# Patient Record
Sex: MALE | Race: WHITE | NOT HISPANIC OR LATINO | Employment: OTHER | URBAN - METROPOLITAN AREA
[De-identification: names, ages, dates, MRNs, and addresses within clinical notes are randomized per-mention and may not be internally consistent; named-entity substitution may affect disease eponyms.]

---

## 2017-04-14 ENCOUNTER — HOSPITAL ENCOUNTER (OUTPATIENT)
Dept: GENERAL RADIOLOGY | Facility: HOSPITAL | Age: 62
Discharge: HOME | End: 2017-04-14
Attending: INTERNAL MEDICINE | Admitting: INTERNAL MEDICINE

## 2017-04-14 DIAGNOSIS — R10.2 PELVIC PAIN: ICD-10-CM

## 2017-04-14 DIAGNOSIS — M25.551 RIGHT HIP PAIN: ICD-10-CM

## 2017-04-14 PROCEDURE — 73502 X-RAY EXAM HIP UNI 2-3 VIEWS: CPT

## 2017-12-20 ENCOUNTER — TRANSCRIBE ORDERS (OUTPATIENT)
Dept: ADMINISTRATIVE | Facility: HOSPITAL | Age: 62
End: 2017-12-20

## 2017-12-20 DIAGNOSIS — M54.10 RADICULOPATHY AFFECTING UPPER EXTREMITY: ICD-10-CM

## 2017-12-20 DIAGNOSIS — M54.2 NECK PAIN: Primary | ICD-10-CM

## 2018-01-03 ENCOUNTER — HOSPITAL ENCOUNTER (OUTPATIENT)
Dept: GENERAL RADIOLOGY | Facility: HOSPITAL | Age: 63
Discharge: HOME | End: 2018-01-03
Attending: INTERNAL MEDICINE

## 2018-01-03 ENCOUNTER — HOSPITAL ENCOUNTER (OUTPATIENT)
Dept: INFUSION THERAPY | Facility: HOSPITAL | Age: 63
Discharge: HOME | End: 2018-01-03
Attending: INTERNAL MEDICINE | Admitting: PSYCHIATRY & NEUROLOGY

## 2018-01-03 DIAGNOSIS — M54.10 RADICULOPATHY OF ARM: ICD-10-CM

## 2018-01-03 DIAGNOSIS — M54.2 NECK PAIN: ICD-10-CM

## 2018-01-03 DIAGNOSIS — M79.601 RIGHT ARM PAIN: ICD-10-CM

## 2018-01-03 DIAGNOSIS — M54.10 RADICULOPATHY AFFECTING UPPER EXTREMITY: ICD-10-CM

## 2018-01-03 PROCEDURE — 72050 X-RAY EXAM NECK SPINE 4/5VWS: CPT

## 2018-01-03 PROCEDURE — 95886 MUSC TEST DONE W/N TEST COMP: CPT | Performed by: PSYCHIATRY & NEUROLOGY

## 2018-01-03 PROCEDURE — 95910 NRV CNDJ TEST 7-8 STUDIES: CPT | Performed by: PSYCHIATRY & NEUROLOGY

## 2018-01-03 PROCEDURE — 95886 MUSC TEST DONE W/N TEST COMP: CPT

## 2018-01-03 PROCEDURE — 95910 NRV CNDJ TEST 7-8 STUDIES: CPT

## 2018-01-03 NOTE — PROCEDURES
EMG and Nerve Conduction Studies    Please see data sheets for details on methods, temperatures and lab standards. EMG muscles tested for upper extremity studies include the deltoid, biceps, triceps, pronator teres, extensor digitorum communis, first dorsal interosseous and abductor pollicis brevis.  EMG muscles tested for lower extremity studies include the vastus lateralis, tibialis anterior, peroneus longus, medial gastrocnemius and extensor digitorum brevis.  Additional muscles tested as needed.  Paraspinal muscles tested as needed.  The complete report includes the data sheets.    Indication: Numbness in the right hand greater than left with some aching in the right radial forearm.  No substantial neck shoulder or elbow pain  History: As above      Ht: Not reported  Wt: Not reported  HbA1C:   Lab Results   Component Value Date    HGBA1C 5.4 03/07/2015     TSH: No results found for: TSH    Technical summary:  Nerve conduction studies were obtained in the right arm with some comparisons on the left where indicated.  The hands were cold prior to study and they were warmed in the sink.  Skin temperatures were 32°C measured at the palms.  Needle examination was obtained on selected muscles of the right arm    Results:  1.  Mildly prolonged median sensory latencies bilaterally at 3.7 ms each with normal amplitudes.  2.  Prolonged ulnar sensory latencies bilaterally at 4.4 ms on the left and 4.5 ms on the right with normal amplitudes.  3.  Normal right radial sensory study.  4.  Normal right median motor study.  5.  Normal right ulnar motor study.  6.  Normal needle examination of selected muscles of the right arm.    Impression:  Abnormal study showing mild sensory peripheral neuropathy.  No evidence of a right cervical radiculopathy by this study.  Study results were discussed with the patient.    Casey Oliveira M.D.              Dictated utilizing Dragon dictation.

## 2018-04-20 ENCOUNTER — HOSPITAL ENCOUNTER (OUTPATIENT)
Dept: GENERAL RADIOLOGY | Facility: HOSPITAL | Age: 63
Discharge: HOME | End: 2018-04-20
Attending: INTERNAL MEDICINE | Admitting: INTERNAL MEDICINE

## 2018-04-20 DIAGNOSIS — R94.2 ABNORMAL PULMONARY FUNCTION TEST: ICD-10-CM

## 2018-04-20 PROCEDURE — 71046 X-RAY EXAM CHEST 2 VIEWS: CPT

## 2019-12-01 ENCOUNTER — HOSPITAL ENCOUNTER (EMERGENCY)
Facility: HOSPITAL | Age: 64
Discharge: HOME | End: 2019-12-01
Attending: EMERGENCY MEDICINE | Admitting: EMERGENCY MEDICINE

## 2019-12-01 VITALS
HEIGHT: 73 IN | TEMPERATURE: 97.8 F | SYSTOLIC BLOOD PRESSURE: 170 MMHG | DIASTOLIC BLOOD PRESSURE: 92 MMHG | HEART RATE: 75 BPM | OXYGEN SATURATION: 97 % | RESPIRATION RATE: 22 BRPM

## 2019-12-01 DIAGNOSIS — K08.89 PAIN, DENTAL: Primary | ICD-10-CM

## 2019-12-01 PROCEDURE — 99283 EMERGENCY DEPT VISIT LOW MDM: CPT

## 2019-12-01 RX ORDER — SIMVASTATIN 20 MG
TABLET ORAL
COMMUNITY
Start: 2019-08-16

## 2019-12-01 RX ORDER — WARFARIN SODIUM 4 MG/1
TABLET ORAL
COMMUNITY
Start: 2012-07-31

## 2019-12-01 RX ORDER — WARFARIN SODIUM 4 MG/1
TABLET ORAL
COMMUNITY
Start: 2013-07-03

## 2019-12-01 RX ORDER — NITROGLYCERIN 0.4 MG/1
0.4 TABLET SUBLINGUAL
COMMUNITY
Start: 2012-11-11

## 2019-12-01 RX ORDER — PYRAZINAMIDE 500 MG/1
300 TABLET ORAL 3 TIMES DAILY PRN
COMMUNITY
End: 2020-04-15

## 2019-12-01 RX ORDER — FLUOCINONIDE TOPICAL SOLUTION USP, 0.05% 0.5 MG/ML
SOLUTION TOPICAL
COMMUNITY

## 2019-12-01 RX ORDER — PRIMIDONE 50 MG/1
TABLET ORAL
COMMUNITY
Start: 2019-02-21

## 2019-12-01 RX ORDER — SIMVASTATIN 40 MG
TABLET ORAL
COMMUNITY

## 2019-12-01 RX ORDER — CHLORHEXIDINE GLUCONATE 0.12 MG/ML
RINSE ORAL
COMMUNITY
Start: 2019-07-01

## 2019-12-01 RX ORDER — HYDROCODONE BITARTRATE AND ACETAMINOPHEN 5; 325 MG/1; MG/1
1 TABLET ORAL EVERY 6 HOURS PRN
Qty: 12 TABLET | Refills: 0 | OUTPATIENT
Start: 2019-12-01 | End: 2020-04-15

## 2019-12-01 RX ORDER — HYDROCODONE BITARTRATE AND ACETAMINOPHEN 5; 325 MG/1; MG/1
1 TABLET ORAL ONCE
Status: COMPLETED | OUTPATIENT
Start: 2019-12-01 | End: 2019-12-01

## 2019-12-01 RX ORDER — CHOLECALCIFEROL (VITAMIN D3) 1250 MCG
1 CAPSULE ORAL
COMMUNITY

## 2019-12-01 RX ORDER — CLONAZEPAM 0.5 MG/1
TABLET ORAL
COMMUNITY
Start: 2019-09-20

## 2019-12-01 RX ADMIN — HYDROCODONE BITARTRATE AND ACETAMINOPHEN 1 TABLET: 5; 325 TABLET ORAL at 18:22

## 2019-12-01 NOTE — DISCHARGE INSTRUCTIONS
Continue current medications, follow-up with cardiology and your dentist.  Return to the emergency department for worsening symptoms as needed.

## 2019-12-01 NOTE — ED NOTES
Assisted patient to the restroom with Myesha AMADOR. Patient tolerated well     Sarita Connors RN  12/01/19 8054

## 2019-12-01 NOTE — ED PROVIDER NOTES
EMERGENCY DEPARTMENT ENCOUNTER    Room Number:  08/08  Date of encounter:  12/1/2019  PCP: Jacob Mccartney MD  Historian: Patient      HPI:  Chief Complaint: Dental pain  A complete HPI/ROS/PMH/PSH/SH/FH are unobtainable due to: None    Context: Hans Carter is a 64 y.o. male who presents to the ED c/o dental pain in the right upper and lower jaw, reports plan for dental procedures in the next week or 2 pending appropriate transition off of Coumadin.  Patient is already on antibiotics, but had a severe pain spell today at dinner.  Denies fevers, difficulty swallowing, neck pain.      PAST MEDICAL HISTORY  Active Ambulatory Problems     Diagnosis Date Noted   • No Active Ambulatory Problems     Resolved Ambulatory Problems     Diagnosis Date Noted   • No Resolved Ambulatory Problems     Past Medical History:   Diagnosis Date   • Blind    • Coronary artery disease    • Hyperlipidemia    • Hypertension    • Marfan syndrome          PAST SURGICAL HISTORY  Past Surgical History:   Procedure Laterality Date   • CARDIAC VALVE SURGERY           FAMILY HISTORY  History reviewed. No pertinent family history.      SOCIAL HISTORY  Social History     Socioeconomic History   • Marital status:      Spouse name: Not on file   • Number of children: Not on file   • Years of education: Not on file   • Highest education level: Not on file   Tobacco Use   • Smoking status: Never Smoker   Substance and Sexual Activity   • Alcohol use: No     Frequency: Never   • Drug use: No   • Sexual activity: Defer         ALLERGIES  Erythromycin        REVIEW OF SYSTEMS  Review of Systems     All systems reviewed and negative except for those discussed in HPI.       PHYSICAL EXAM    I have reviewed the triage vital signs and nursing notes.    ED Triage Vitals [12/01/19 1734]   Temp Heart Rate Resp BP SpO2   97.8 °F (36.6 °C) 75 22 (!) 180/108 97 %      Temp src Heart Rate Source Patient Position BP Location FiO2 (%)   Oral Monitor  Lying -- --       Physical Exam  General: Awake, alert, no acute distress  HEENT: Poor dentition diffusely with multiple caries noted in the upper and lower jaw.  No evidence of obvious periodontal abscesses or severe gingival erythema or drainage.  Posterior oropharynx is clear  Pulm: Symmetric chest rise, nonlabored breathing, no stridor  CV: Regular rate and rhythm  GI: Non-distended  MSK: No deformity  Skin: Warm, dry  Neuro: Alert and oriented x 3, moving all extremities, no focal deficits  Psych: Calm, cooperative    Vital signs and nursing notes reviewed.           LAB RESULTS  No results found for this or any previous visit (from the past 24 hour(s)).        RADIOLOGY  No Radiology Exams Resulted Within Past 24 Hours        PROCEDURES    Procedures      MEDICATIONS GIVEN IN ER    Medications   HYDROcodone-acetaminophen (NORCO) 5-325 MG per tablet 1 tablet (1 tablet Oral Given 12/1/19 1822)         PROGRESS, DATA ANALYSIS, CONSULTS, AND MEDICAL DECISION MAKING    All labs have been independently reviewed by me.  All radiology studies have been reviewed by me and discussed with radiologist dictating the report.   EKG's independently viewed and interpreted by me.  Discussion below represents my analysis of pertinent findings related to patient's condition, differential diagnosis, treatment plan and final disposition.    Patient comes in today for violation for dental pain.  Patient has Milo seen a dentist and there is plans for dental extraction, but he is on Coumadin and is working with cardiology to appropriately come off his Coumadin.  Has appointment with him this week.  Patient already on antibiotics.  At this point there are no drainable abscesses noted, no need for additional antibiotics.  Due to being on Coumadin currently, will give short course of Norco for pain control as anti-inflammatories would increase risk of bleeding at this point.  Patient given a [place] in the emergency department, plan for  discharge home with close dental and cardiology follow-up as discussed.  Advised return to the emergency department for worsening symptoms as needed.    ED Course as of Dec 01 1923   Sun Dec 01, 2019   1747 SACHI reviewed, 16 [place] 5 prescribed 11/11/19  [DC]      ED Course User Index  [DC] Hans Alamo MD       AS OF 7:23 PM VITALS:    BP - 170/92  HR - 75  TEMP - 97.8 °F (36.6 °C) (Oral)  02 SATS - 97%        DIAGNOSIS  Final diagnoses:   Pain, dental         DISPOSITION  DISCHARGE    Patient discharged in stable condition.    Reviewed implications of results, diagnosis, meds, responsibility to follow up, warning signs and symptoms of possible worsening, potential complications and reasons to return to ER.    Patient/Family voiced understanding of above instructions.    Discussed plan for discharge, as there is no emergent indication for admission. Patient referred to primary care provider for BP management due to today's BP. Pt/family is agreeable and understands need for follow up and repeat testing.  Pt is aware that discharge does not mean that nothing is wrong but it indicates no emergency is present that requires admission and they must continue care with follow-up as given below or physician of their choice.     FOLLOW-UP  [facility] Emergency Department  [address] [facility] [address] [phone]    As needed, If symptoms worsen    Jacob Mccartney MD  [address] [facility] [id number] [phone]    Schedule an appointment as soon as possible for a visit   As needed    Your Dentist    Call   As needed         Medication List      New Prescriptions    HYDROcodone-acetaminophen 5-325 MG per tablet  Commonly known as:  NORCO  Take 1 tablet by mouth Every 6 (Six) Hours As Needed for Severe Pain . Do   not drive or mix with alcohol                     Hans Alamo MD  12/01/19 1923

## 2019-12-08 ENCOUNTER — HOSPITAL ENCOUNTER (EMERGENCY)
Facility: HOSPITAL | Age: 64
Discharge: HOME | End: 2019-12-08
Attending: EMERGENCY MEDICINE | Admitting: EMERGENCY MEDICINE

## 2019-12-08 VITALS
WEIGHT: 167 LBS | HEIGHT: 74 IN | RESPIRATION RATE: 18 BRPM | BODY MASS INDEX: 21.43 KG/M2 | OXYGEN SATURATION: 96 % | DIASTOLIC BLOOD PRESSURE: 87 MMHG | SYSTOLIC BLOOD PRESSURE: 131 MMHG | TEMPERATURE: 97.6 F | HEART RATE: 69 BPM

## 2019-12-08 DIAGNOSIS — K08.89 PAIN, DENTAL: Primary | ICD-10-CM

## 2019-12-08 DIAGNOSIS — Z79.01 CHRONIC ANTICOAGULATION: ICD-10-CM

## 2019-12-08 PROCEDURE — 99283 EMERGENCY DEPT VISIT LOW MDM: CPT

## 2019-12-08 RX ORDER — HYDROCODONE BITARTRATE AND ACETAMINOPHEN 5; 325 MG/1; MG/1
1 TABLET ORAL EVERY 6 HOURS PRN
Qty: 12 TABLET | Refills: 0 | OUTPATIENT
Start: 2019-12-08 | End: 2020-04-15

## 2019-12-08 RX ORDER — HYDROCODONE BITARTRATE AND ACETAMINOPHEN 5; 325 MG/1; MG/1
1 TABLET ORAL ONCE
Status: COMPLETED | OUTPATIENT
Start: 2019-12-08 | End: 2019-12-08

## 2019-12-08 RX ADMIN — HYDROCODONE BITARTRATE AND ACETAMINOPHEN 1 TABLET: 5; 325 TABLET ORAL at 12:07

## 2019-12-08 NOTE — DISCHARGE INSTRUCTIONS
Take medications as prescribed as needed for severe pain, follow-up with your dentist as well as your primary care provider to help facilitate and coordinate between dentistry and your INR issues.  Return to the emerge department for worsening symptoms as needed.

## 2019-12-08 NOTE — ED PROVIDER NOTES
EMERGENCY DEPARTMENT ENCOUNTER    Room Number:  06/06  Date of encounter:  12/8/2019  PCP: Jacob Mccartney MD  Historian: Patient      HPI:  Chief Complaint: Dental pain  A complete HPI/ROS/PMH/PSH/SH/FH are unobtainable due to: None    Context: Hans Carter is a 64 y.o. male who presents to the ED c/o several weeks of persistent right upper and right lower dental and gum pain.  Is supposed to have a dental procedure done but has been working with his cardiology team about his INR with his Coumadin prior to the procedure.  Patient has been on hydrocodone for pain support which has been helping but is not currently out of his medications.  Has a scheduled appointment on Thursday with his INR team with hopeful plans to build to stop his Coumadin at that point prior to procedure.  Denies any new changes otherwise, is currently taking amoxicillin.      PAST MEDICAL HISTORY  Active Ambulatory Problems     Diagnosis Date Noted   • No Active Ambulatory Problems     Resolved Ambulatory Problems     Diagnosis Date Noted   • No Resolved Ambulatory Problems     Past Medical History:   Diagnosis Date   • Blind    • Blind    • Carotid artery stenosis    • CHF (congestive heart failure) (CMS/[facility])    • Coronary artery disease    • Hyperlipidemia    • Hypertension    • Kidney stone    • Marfan syndrome    • TIA (transient ischemic attack)          PAST SURGICAL HISTORY  Past Surgical History:   Procedure Laterality Date   • AORTIC VALVE REPAIR/REPLACEMENT     • CARDIAC CATHETERIZATION     • CARDIAC VALVE REPLACEMENT     • CARDIAC VALVE SURGERY     • CORONARY ARTERY BYPASS GRAFT     • CYSTOSCOPY INSERTION / REMOVAL STENT / STONE           FAMILY HISTORY  History reviewed. No pertinent family history.      SOCIAL HISTORY  Social History     Socioeconomic History   • Marital status:      Spouse name: Not on file   • Number of children: Not on file   • Years of education: Not on file   • Highest education level: Not on  file   Tobacco Use   • Smoking status: Never Smoker   Substance and Sexual Activity   • Alcohol use: No     Frequency: Never   • Drug use: No   • Sexual activity: Defer         ALLERGIES  Erythromycin        REVIEW OF SYSTEMS  Review of Systems     All systems reviewed and negative except for those discussed in HPI.       PHYSICAL EXAM    I have reviewed the triage vital signs and nursing notes.    ED Triage Vitals   Temp Heart Rate Resp BP SpO2   12/08/19 1034 12/08/19 1034 12/08/19 1034 12/08/19 1038 12/08/19 1034   97.6 °F (36.4 °C) 69 18 131/87 96 %      Temp src Heart Rate Source Patient Position BP Location FiO2 (%)   12/08/19 1034 12/08/19 1034 -- -- --   Tympanic Monitor          Physical Exam  General: Awake, alert, no acute distress  HEENT: EOMI, poor dentition diffusely but no evidence of periodontal abscesses noted in the right upper or lower jaw  Pulm: Symmetric chest rise, nonlabored breathing, CTAB  CV: Regular rate and rhythm  GI: Non-distended  MSK: No deformity  Skin: Warm, dry  Neuro: Alert and oriented x 3, moving all extremities, no focal deficits  Psych: Calm, cooperative    Vital signs and nursing notes reviewed.           LAB RESULTS  No results found for this or any previous visit (from the past 24 hour(s)).            RADIOLOGY  No Radiology Exams Resulted Within Past 24 Hours        PROCEDURES    Procedures      MEDICATIONS GIVEN IN ER    Medications   HYDROcodone-acetaminophen (NORCO) 5-325 MG per tablet 1 tablet (1 tablet Oral Given 12/8/19 1207)         PROGRESS, DATA ANALYSIS, CONSULTS, AND MEDICAL DECISION MAKING    All labs have been independently reviewed by me.  All radiology studies have been reviewed by me and discussed with radiologist dictating the report.   EKG's independently viewed and interpreted by me.  Discussion below represents my analysis of pertinent findings related to patient's condition, differential diagnosis, treatment plan and final disposition.        ED Course  as of Dec 08 1607   [place] Dec 08, 2019   Gracie KARIMI reviewed, last filled 12 Norco 5 on 12/01, rx by myself. Will extend another 12 tablets, advised PCP/Dental follow up for additional pain support.     [DC]      ED Course User Index  [DC] Hans Alamo MD       AS OF 4:07 PM VITALS:    BP - 131/87  HR - 69  TEMP - 97.6 °F (36.4 °C) (Tympanic)  02 SATS - 96%        DIAGNOSIS  Final diagnoses:   Pain, dental   Chronic anticoagulation         DISPOSITION  DISCHARGE    Patient discharged in stable condition.    Reviewed implications of results, diagnosis, meds, responsibility to follow up, warning signs and symptoms of possible worsening, potential complications and reasons to return to ER.    Patient/Family voiced understanding of above instructions.    Discussed plan for discharge, as there is no emergent indication for admission. Patient referred to primary care provider for BP management due to today's BP. Pt/family is agreeable and understands need for follow up and repeat testing.  Pt is aware that discharge does not mean that nothing is wrong but it indicates no emergency is present that requires admission and they must continue care with follow-up as given below or physician of their choice.     FOLLOW-UP  [facility] Emergency Department  [address]  [facility] [address] [phone]    As needed, If symptoms worsen    Jacob Mccartney MD  [address] [facility] [id number] [phone]    Call   to help facilitate and coordinate issues with INR and dentistry         Medication List      Changed    * HYDROcodone-acetaminophen 5-325 MG per tablet  Commonly known as:  NORCO  Take 1 tablet by mouth Every 6 (Six) Hours As Needed for Severe Pain . Do   not drive or mix with alcohol  What changed:  Another medication with the same name was added. Make sure   you understand how and when to take each.     * HYDROcodone-acetaminophen 5-325 MG per tablet  Commonly known  as:  NORCO  Take 1 tablet by mouth Every 6 (Six) Hours As Needed for Severe Pain . Do   not drive or mix with alcohol  What changed:  You were already taking a medication with the same name,   and this prescription was added. Make sure you understand how and when to   take each.         * This list has 2 medication(s) that are the same as other medications   prescribed for you. Read the directions carefully, and ask your doctor or   other care provider to review them with you.                         Hans Alamo MD  12/08/19 9945

## 2019-12-08 NOTE — ED NOTES
Pt reports dental pain rt lower and upper side, pt states dentist tried root canal that was not successful and multiple teeth and now is supposed to have one tooth on upper right side pulled and two teeth on bottom right side pulled. Pt states unable to do so at this time due to being on coumadin and trying to get PT/INR at certain level prior to pulling teeth. Pt states currently out of pain medication.      Jordan Roberts RN  12/08/19 0661

## 2020-04-15 ENCOUNTER — TRANSCRIBE ORDERS (OUTPATIENT)
Dept: ADMINISTRATIVE | Facility: HOSPITAL | Age: 65
End: 2020-04-15

## 2020-04-15 ENCOUNTER — HOSPITAL ENCOUNTER (OUTPATIENT)
Dept: CT IMAGING | Facility: HOSPITAL | Age: 65
Discharge: HOME | End: 2020-04-15
Admitting: INTERNAL MEDICINE

## 2020-04-15 ENCOUNTER — HOSPITAL ENCOUNTER (EMERGENCY)
Facility: HOSPITAL | Age: 65
Discharge: HOME | End: 2020-04-15
Attending: EMERGENCY MEDICINE | Admitting: EMERGENCY MEDICINE

## 2020-04-15 VITALS
DIASTOLIC BLOOD PRESSURE: 95 MMHG | RESPIRATION RATE: 16 BRPM | SYSTOLIC BLOOD PRESSURE: 147 MMHG | OXYGEN SATURATION: 99 % | BODY MASS INDEX: 23.6 KG/M2 | HEART RATE: 57 BPM | WEIGHT: 174.2 LBS | TEMPERATURE: 98.1 F | HEIGHT: 72 IN

## 2020-04-15 DIAGNOSIS — K62.5 RECTAL BLEED: Primary | ICD-10-CM

## 2020-04-15 DIAGNOSIS — R31.9 HEMATURIA, UNSPECIFIED TYPE: ICD-10-CM

## 2020-04-15 DIAGNOSIS — R31.9 HEMATURIA, UNSPECIFIED TYPE: Primary | ICD-10-CM

## 2020-04-15 LAB
ALBUMIN SERPL-MCNC: 4 G/DL (ref 3.5–5.2)
ALBUMIN/GLOB SERPL: 1.4 G/DL
ALP SERPL-CCNC: 66 U/L (ref 39–117)
ALT SERPL W P-5'-P-CCNC: 29 U/L (ref 1–41)
ANION GAP SERPL CALCULATED.3IONS-SCNC: 10.4 MMOL/L (ref 5–15)
APTT PPP: 42.3 SECONDS (ref 22.7–35.4)
AST SERPL-CCNC: 18 U/L (ref 1–40)
BASOPHILS # BLD AUTO: 0.01 10*3/MM3 (ref 0–0.2)
BASOPHILS NFR BLD AUTO: 0.3 % (ref 0–1.5)
BILIRUB SERPL-MCNC: 0.3 MG/DL (ref 0.2–1.2)
BUN BLD-MCNC: 14 MG/DL (ref 8–23)
BUN/CREAT SERPL: 15.7 (ref 7–25)
CALCIUM SPEC-SCNC: 8.8 MG/DL (ref 8.6–10.5)
CHLORIDE SERPL-SCNC: 104 MMOL/L (ref 98–107)
CO2 SERPL-SCNC: 25.6 MMOL/L (ref 22–29)
CREAT BLD-MCNC: 0.89 MG/DL (ref 0.76–1.27)
DEPRECATED RDW RBC AUTO: 44 FL (ref 37–54)
EOSINOPHIL # BLD AUTO: 0.09 10*3/MM3 (ref 0–0.4)
EOSINOPHIL NFR BLD AUTO: 2.4 % (ref 0.3–6.2)
ERYTHROCYTE [DISTWIDTH] IN BLOOD BY AUTOMATED COUNT: 12.5 % (ref 12.3–15.4)
EXPIRATION DATE: NORMAL
FECAL OCCULT BLOOD SCREEN, POC: NEGATIVE
GFR SERPL CREATININE-BSD FRML MDRD: 85 ML/MIN/1.73
GLOBULIN UR ELPH-MCNC: 2.9 GM/DL
GLUCOSE BLD-MCNC: 116 MG/DL (ref 65–99)
HCT VFR BLD AUTO: 46 % (ref 37.5–51)
HGB BLD-MCNC: 15.4 G/DL (ref 13–17.7)
IMM GRANULOCYTES # BLD AUTO: 0.01 10*3/MM3 (ref 0–0.05)
IMM GRANULOCYTES NFR BLD AUTO: 0.3 % (ref 0–0.5)
INR PPP: 2.4 (ref 0.9–1.1)
LYMPHOCYTES # BLD AUTO: 1.26 10*3/MM3 (ref 0.7–3.1)
LYMPHOCYTES NFR BLD AUTO: 33.4 % (ref 19.6–45.3)
Lab: 140
MCH RBC QN AUTO: 32.6 PG (ref 26.6–33)
MCHC RBC AUTO-ENTMCNC: 33.5 G/DL (ref 31.5–35.7)
MCV RBC AUTO: 97.5 FL (ref 79–97)
MONOCYTES # BLD AUTO: 0.38 10*3/MM3 (ref 0.1–0.9)
MONOCYTES NFR BLD AUTO: 10.1 % (ref 5–12)
NEGATIVE CONTROL: NEGATIVE
NEUTROPHILS # BLD AUTO: 2.02 10*3/MM3 (ref 1.7–7)
NEUTROPHILS NFR BLD AUTO: 53.5 % (ref 42.7–76)
NRBC BLD AUTO-RTO: 0 /100 WBC (ref 0–0.2)
PLATELET # BLD AUTO: 164 10*3/MM3 (ref 140–450)
PMV BLD AUTO: 9.5 FL (ref 6–12)
POSITIVE CONTROL: POSITIVE
POTASSIUM BLD-SCNC: 4.7 MMOL/L (ref 3.5–5.2)
PROT SERPL-MCNC: 6.9 G/DL (ref 6–8.5)
PROTHROMBIN TIME: 25.8 SECONDS (ref 11.7–14.2)
RBC # BLD AUTO: 4.72 10*6/MM3 (ref 4.14–5.8)
SODIUM BLD-SCNC: 140 MMOL/L (ref 136–145)
WBC NRBC COR # BLD: 3.77 10*3/MM3 (ref 3.4–10.8)

## 2020-04-15 PROCEDURE — 82270 OCCULT BLOOD FECES: CPT | Performed by: NURSE PRACTITIONER

## 2020-04-15 PROCEDURE — 80053 COMPREHEN METABOLIC PANEL: CPT | Performed by: NURSE PRACTITIONER

## 2020-04-15 PROCEDURE — 99284 EMERGENCY DEPT VISIT MOD MDM: CPT

## 2020-04-15 PROCEDURE — 85610 PROTHROMBIN TIME: CPT | Performed by: NURSE PRACTITIONER

## 2020-04-15 PROCEDURE — 85730 THROMBOPLASTIN TIME PARTIAL: CPT | Performed by: NURSE PRACTITIONER

## 2020-04-15 PROCEDURE — 74176 CT ABD & PELVIS W/O CONTRAST: CPT

## 2020-04-15 PROCEDURE — 85025 COMPLETE CBC W/AUTO DIFF WBC: CPT | Performed by: NURSE PRACTITIONER

## 2020-04-15 RX ORDER — POLYETHYLENE GLYCOL 3350 17 G/17G
17 POWDER, FOR SOLUTION ORAL DAILY
Qty: 30 EACH | Refills: 0 | Status: SHIPPED | OUTPATIENT
Start: 2020-04-15

## 2020-04-15 RX ORDER — TAMSULOSIN HYDROCHLORIDE 0.4 MG/1
1 CAPSULE ORAL DAILY
COMMUNITY

## 2020-04-15 RX ORDER — METOPROLOL SUCCINATE 25 MG/1
TABLET, EXTENDED RELEASE ORAL
COMMUNITY
Start: 2019-07-30

## 2020-04-15 RX ORDER — SODIUM CHLORIDE 0.9 % (FLUSH) 0.9 %
10 SYRINGE (ML) INJECTION AS NEEDED
Status: DISCONTINUED | OUTPATIENT
Start: 2020-04-15 | End: 2020-04-15 | Stop reason: HOSPADM

## 2020-04-15 NOTE — ED PROVIDER NOTES
EMERGENCY DEPARTMENT ENCOUNTER    CHIEF COMPLAINT  Chief Complaint: Rectal bleeding  History given by: Patient  History limited by: Visually impaired  Room Number: 06/06  PMD: Jacob Mccartney MD      HPI:  Pt is a 65 y.o. male who presents with complaint of blood in his stool.  Patient is visually impaired and states that he has a caregiver that helps he and his wife.  He states that she noticed blood in his stool yesterday.  Patient denies any symptoms including fever, chills, chest pain, shortness of breath, abdominal pain.  Patient states that he did strain to have a bowel movement.  Patient is unsure when he had a colonoscopy.  Patient states that he is on Coumadin  Past Medical History of blind, coronary artery disease, hypertension, CHF    Duration: Yesterday  Timing: Intermittent  Location: GI  Radiation: None  Quality: Rectal bleeding  Intensity/Severity: Mild  Progression: Unchanged  Associated Symptoms: None  Aggravating Factors: Strained to have a bowel movement  Alleviating Factors: None  Previous Episodes: None  Treatment before arrival: None    PAST MEDICAL HISTORY  Active Ambulatory Problems     Diagnosis Date Noted   • No Active Ambulatory Problems     Resolved Ambulatory Problems     Diagnosis Date Noted   • No Resolved Ambulatory Problems     Past Medical History:   Diagnosis Date   • Blind    • Blind    • Carotid artery stenosis    • CHF (congestive heart failure) (CMS/[facility])    • Coronary artery disease    • Hyperlipidemia    • Hypertension    • Kidney stone    • Marfan syndrome    • TIA (transient ischemic attack)        PAST SURGICAL HISTORY  Past Surgical History:   Procedure Laterality Date   • AORTIC VALVE REPAIR/REPLACEMENT     • CARDIAC CATHETERIZATION     • CARDIAC VALVE REPLACEMENT     • CARDIAC VALVE SURGERY     • CORONARY ARTERY BYPASS GRAFT     • CYSTOSCOPY INSERTION / REMOVAL STENT / STONE         FAMILY HISTORY  History reviewed. No pertinent family history.    SOCIAL  HISTORY  Social History     Socioeconomic History   • Marital status:      Spouse name: Not on file   • Number of children: Not on file   • Years of education: Not on file   • Highest education level: Not on file   Tobacco Use   • Smoking status: Never Smoker   Substance and Sexual Activity   • Alcohol use: No     Frequency: Never   • Drug use: No   • Sexual activity: Defer         ALLERGIES  Erythromycin    REVIEW OF SYSTEMS  Review of Systems   Constitutional: Negative for chills and fever.   HENT: Negative for congestion.    Respiratory: Negative for cough and shortness of breath.    Cardiovascular: Negative for chest pain.   Gastrointestinal: Positive for blood in stool. Negative for abdominal pain, diarrhea, nausea and vomiting.   Genitourinary: Negative for dysuria.   Musculoskeletal: Negative for back pain.   Skin: Negative for rash.   Neurological: Negative for headaches.   Psychiatric/Behavioral: The patient is not nervous/anxious.        PHYSICAL EXAM  ED Triage Vitals   Temp Heart Rate Resp BP SpO2   04/15/20 0830 04/15/20 0837 04/15/20 0837 04/15/20 0837 04/15/20 0837   98.1 °F (36.7 °C) 71 14 134/86 98 %       Physical Exam   Constitutional: He is well-developed, well-nourished, and in no distress. No distress.   HENT:   Head: Atraumatic.   Mouth/Throat: Mucous membranes are normal.   Eyes: No scleral icterus.   Neck: Normal range of motion.   Cardiovascular: Normal rate, regular rhythm and normal heart sounds.   Pulmonary/Chest: Effort normal and breath sounds normal.   Abdominal: Soft. Normal appearance and bowel sounds are normal. There is no tenderness.   Genitourinary: Rectal exam shows no external hemorrhoid and guaiac negative stool.   Genitourinary Comments: RN at bedside during rectal exam. Brown, Heme negative stool noted in rectal vault.    Musculoskeletal: Normal range of motion.   Neurological: He is alert.   Skin: Skin is warm and dry.   Psychiatric: Mood and affect normal.    Nursing note and vitals reviewed.      LAB RESULTS  Recent Results (from the past 24 hour(s))   Comprehensive Metabolic Panel    Collection Time: 04/15/20  8:53 AM   Result Value Ref Range    Glucose 116 (H) 65 - 99 mg/dL    BUN 14 8 - 23 mg/dL    Creatinine 0.89 0.76 - 1.27 mg/dL    Sodium 140 136 - 145 mmol/L    Potassium 4.7 3.5 - 5.2 mmol/L    Chloride 104 98 - 107 mmol/L    CO2 25.6 22.0 - 29.0 mmol/L    Calcium 8.8 8.6 - 10.5 mg/dL    Total Protein 6.9 6.0 - 8.5 g/dL    Albumin 4.00 3.50 - 5.20 g/dL    ALT (SGPT) 29 1 - 41 U/L    AST (SGOT) 18 1 - 40 U/L    Alkaline Phosphatase 66 39 - 117 U/L    Total Bilirubin 0.3 0.2 - 1.2 mg/dL    eGFR Non African Amer 85 >60 mL/min/1.73    Globulin 2.9 gm/dL    A/G Ratio 1.4 g/dL    BUN/Creatinine Ratio 15.7 7.0 - 25.0    Anion Gap 10.4 5.0 - 15.0 mmol/L   Protime-INR    Collection Time: 04/15/20  8:53 AM   Result Value Ref Range    Protime 25.8 (H) 11.7 - 14.2 Seconds    INR 2.40 (H) 0.90 - 1.10   aPTT    Collection Time: 04/15/20  8:53 AM   Result Value Ref Range    PTT 42.3 (H) 22.7 - 35.4 seconds   CBC Auto Differential    Collection Time: 04/15/20  8:53 AM   Result Value Ref Range    WBC 3.77 3.40 - 10.80 10*3/mm3    RBC 4.72 4.14 - 5.80 10*6/mm3    Hemoglobin 15.4 13.0 - 17.7 g/dL    Hematocrit 46.0 37.5 - 51.0 %    MCV 97.5 (H) 79.0 - 97.0 fL    MCH 32.6 26.6 - 33.0 pg    MCHC 33.5 31.5 - 35.7 g/dL    RDW 12.5 12.3 - 15.4 %    RDW-SD 44.0 37.0 - 54.0 fl    MPV 9.5 6.0 - 12.0 fL    Platelets 164 140 - 450 10*3/mm3    Neutrophil % 53.5 42.7 - 76.0 %    Lymphocyte % 33.4 19.6 - 45.3 %    Monocyte % 10.1 5.0 - 12.0 %    Eosinophil % 2.4 0.3 - 6.2 %    Basophil % 0.3 0.0 - 1.5 %    Immature Grans % 0.3 0.0 - 0.5 %    Neutrophils, Absolute 2.02 1.70 - 7.00 10*3/mm3    Lymphocytes, Absolute 1.26 0.70 - 3.10 10*3/mm3    Monocytes, Absolute 0.38 0.10 - 0.90 10*3/mm3    Eosinophils, Absolute 0.09 0.00 - 0.40 10*3/mm3    Basophils, Absolute 0.01 0.00 - 0.20 10*3/mm3     Immature Grans, Absolute 0.01 0.00 - 0.05 10*3/mm3    nRBC 0.0 0.0 - 0.2 /100 WBC   POCT Occult Blood Stool    Collection Time: 04/15/20  9:35 AM   Result Value Ref Range    Fecal Occult Blood Negative Negative    Lot Number [id number]     Expiration Date 11,302,020     Positive Control Positive Positive    Negative Control Negative Negative       I ordered the above labs and reviewed the results      PROGRESS AND CONSULTS  Patient was placed in face mask in first look. Patient was wearing facemask when I entered the room and throughout our encounter. I wore full protective equipment throughout this patient encounter including a face mask, eye protection and gloves. Hand hygiene was performed before donning protective equipment and after removal when leaving the room.      1030:Reviewed pt's history and workup with Dr. Cho.  At bedside evaluation, they agree with the plan of care.    1050: Discussed with patient that he will be sent home with a prescription for MiraLAX and that he should increase water and fiber in his diet.  Went over unremarkable labs today.  Patient given strict return to ER precautions.  Patient verbalized understanding and is agreeable to this plan.  Reviewed implications of results, diagnosis, meds, responsibility to follow up, warning signs and symptoms of possible worsening, potential complications and reasons to return to ER with patient.  Discussed all results and noted any abnormalities with patient.  Discussed absolute need to recheck abnormalities with PMD    Discussed plan for discharge, as there is no emergent indication for admission.  Pt is agreeable and understands need for follow up and repeat testing.  Pt is aware that discharge does not mean that nothing is wrong but it indicates no emergency is present.  Pt is discharged with instructions to follow up with primary care doctor to have their blood pressure rechecked.       DIAGNOSIS  Final diagnoses:   Rectal bleed       FOLLOW UP  "  Jacob Mccartney MD  [address] [facility] [address]  [place]  [facility] [address]  [phone]    Today        RX     Medication List      New Prescriptions    polyethylene glycol packet  Commonly known as:  MIRALAX  Take 17 g by mouth Daily.          COURSE & MEDICAL DECISION MAKING  Pertinent Labs that were ordered and reviewed are noted above.  Results were reviewed/discussed with the patient and they were also made aware of online assess.   Pt also made aware that some labs, such as cultures, will not be resulted during ER visit and follow up with PMD is necessary.     MEDICATIONS GIVEN IN ER  Medications   sodium chloride 0.9 % flush 10 mL (has no administration in time range)       /86   Pulse 71   Temp 98.1 °F (36.7 °C)   Resp 14   Ht 182.9 cm (72\")   Wt 79 kg (174 lb 3.2 oz)   SpO2 98%   BMI 23.63 kg/m²         EMILY Glover on 4/15/2020 at 10:57.       Michelle Dowd, APRN  04/15/20 1101    "

## 2020-04-15 NOTE — DISCHARGE INSTRUCTIONS
Medications as ordered  Continue current home medications  Increase water and fiber in your diet  Follow-up with PMD  Return to the ER for fever, chills, nausea, vomiting, diarrhea, constipation, abdominal pain or any new or worsening symptoms

## 2020-04-15 NOTE — ED NOTES
Pt is visually impaired. His caregiver found a small amt of blood in his bed this morning. Source of the blood is unknown. Pt denies any pain or discomfort.    Pt arrived wearing a mask.     Ivis Robertson RN  04/15/20 0853

## 2020-04-15 NOTE — ED PROVIDER NOTES
MD ATTESTATION NOTE    The RUPERTO and I have discussed this patient's history, physical exam, and treatment plan. I have reviewed the documentation and personally had a face to face interaction with the patient. I affirm the RUPERTO documentation and agree with their diagnostics, findings, treatment, plan, and disposition.  The attached note describes my personal findings.    Hans Carter is a 65 y.o. male who presents to the ED c/o suspected rectal bleeding.  Patient states he has been constipated, having a bowel movement every 3 to 4 days, having to strain with bowel movements.  Patient reports last bowel movement yesterday.  Patient states he had blood in his stool yesterday.  Stool was formed, single stool.  Patient states that caregiver noticed blood on the sheets this morning.  Patient denies any recent emesis, no abdominal pain, no chest pain or difficulty breathing, no lightheadedness or dizziness.    On exam:  General: NAD  Head: NCAT  ENT: Extraocular motion intact, pupils equal and round reactive to light, moist mucous membranes  Neck: Supple, trachea midline  Cardiac, regular rate and rhythm  Lungs: Clear to auscultation bilaterally  Abdomen: Soft, nontender, no rebound tenderness/guarding/rigidity  Extremities: Moves all extremities well, no peripheral edema  Skin: Warm, dry    Medical Decision Making:  Face mask and gloves were worn throughout the patient encounter, unless additional PPE was worn and specified below. Hand hygiene was performed before entering and after leaving the patient room.    Plan for lab work and digital rectal exam with Hemoccult.  ED Course as of Apr 15 1249   Wed Apr 15, 2020   0930 POCT Occult Blood Stool [MS]   0933 POCT Occult Blood Stool [MS]   0933 POCT Occult Blood Stool [MS]      ED Course User Index  [MS] Michelle Dowd, APRN       Diagnosis  Final diagnoses:   Rectal bleed        Stephan Cho MD  04/15/20 1244

## 2020-07-16 ENCOUNTER — APPOINTMENT (OUTPATIENT)
Dept: GENERAL RADIOLOGY | Facility: HOSPITAL | Age: 65
End: 2020-07-16

## 2020-07-16 ENCOUNTER — HOSPITAL ENCOUNTER (EMERGENCY)
Facility: HOSPITAL | Age: 65
Discharge: NURSING, REHAB OR LONG-TERM CARE | End: 2020-07-16
Attending: EMERGENCY MEDICINE | Admitting: EMERGENCY MEDICINE

## 2020-07-16 VITALS
RESPIRATION RATE: 18 BRPM | HEIGHT: 72 IN | SYSTOLIC BLOOD PRESSURE: 122 MMHG | BODY MASS INDEX: 22.75 KG/M2 | HEART RATE: 81 BPM | WEIGHT: 168 LBS | TEMPERATURE: 99.9 F | DIASTOLIC BLOOD PRESSURE: 70 MMHG | OXYGEN SATURATION: 97 %

## 2020-07-16 DIAGNOSIS — R07.89 ATYPICAL CHEST PAIN: Primary | ICD-10-CM

## 2020-07-16 LAB
ALBUMIN SERPL-MCNC: 4.1 G/DL (ref 3.5–5.2)
ALBUMIN/GLOB SERPL: 1.5 G/DL
ALP SERPL-CCNC: 69 U/L (ref 39–117)
ALT SERPL W P-5'-P-CCNC: 19 U/L (ref 1–41)
ANION GAP SERPL CALCULATED.3IONS-SCNC: 9.7 MMOL/L (ref 5–15)
AST SERPL-CCNC: 17 U/L (ref 1–40)
BASOPHILS # BLD AUTO: 0.01 10*3/MM3 (ref 0–0.2)
BASOPHILS NFR BLD AUTO: 0.2 % (ref 0–1.5)
BILIRUB SERPL-MCNC: 0.4 MG/DL (ref 0–1.2)
BUN SERPL-MCNC: 16 MG/DL (ref 8–23)
BUN/CREAT SERPL: 18 (ref 7–25)
CALCIUM SPEC-SCNC: 8.6 MG/DL (ref 8.6–10.5)
CHLORIDE SERPL-SCNC: 101 MMOL/L (ref 98–107)
CO2 SERPL-SCNC: 23.3 MMOL/L (ref 22–29)
CREAT SERPL-MCNC: 0.89 MG/DL (ref 0.76–1.27)
DEPRECATED RDW RBC AUTO: 48.5 FL (ref 37–54)
EOSINOPHIL # BLD AUTO: 0.01 10*3/MM3 (ref 0–0.4)
EOSINOPHIL NFR BLD AUTO: 0.2 % (ref 0.3–6.2)
ERYTHROCYTE [DISTWIDTH] IN BLOOD BY AUTOMATED COUNT: 13.4 % (ref 12.3–15.4)
GFR SERPL CREATININE-BSD FRML MDRD: 85 ML/MIN/1.73
GLOBULIN UR ELPH-MCNC: 2.7 GM/DL
GLUCOSE SERPL-MCNC: 130 MG/DL (ref 65–99)
HCT VFR BLD AUTO: 43.5 % (ref 37.5–51)
HGB BLD-MCNC: 14.6 G/DL (ref 13–17.7)
IMM GRANULOCYTES # BLD AUTO: 0.01 10*3/MM3 (ref 0–0.05)
IMM GRANULOCYTES NFR BLD AUTO: 0.2 % (ref 0–0.5)
INR PPP: 1.45 (ref 0.9–1.1)
LYMPHOCYTES # BLD AUTO: 0.37 10*3/MM3 (ref 0.7–3.1)
LYMPHOCYTES NFR BLD AUTO: 7.2 % (ref 19.6–45.3)
MCH RBC QN AUTO: 32.4 PG (ref 26.6–33)
MCHC RBC AUTO-ENTMCNC: 33.6 G/DL (ref 31.5–35.7)
MCV RBC AUTO: 96.7 FL (ref 79–97)
MONOCYTES # BLD AUTO: 0.31 10*3/MM3 (ref 0.1–0.9)
MONOCYTES NFR BLD AUTO: 6 % (ref 5–12)
NEUTROPHILS NFR BLD AUTO: 4.46 10*3/MM3 (ref 1.7–7)
NEUTROPHILS NFR BLD AUTO: 86.2 % (ref 42.7–76)
NRBC BLD AUTO-RTO: 0 /100 WBC (ref 0–0.2)
PLATELET # BLD AUTO: 119 10*3/MM3 (ref 140–450)
PMV BLD AUTO: 9.4 FL (ref 6–12)
POTASSIUM SERPL-SCNC: 4.5 MMOL/L (ref 3.5–5.2)
PROT SERPL-MCNC: 6.8 G/DL (ref 6–8.5)
PROTHROMBIN TIME: 17.4 SECONDS (ref 11.7–14.2)
RBC # BLD AUTO: 4.5 10*6/MM3 (ref 4.14–5.8)
SODIUM SERPL-SCNC: 134 MMOL/L (ref 136–145)
TROPONIN T SERPL-MCNC: <0.01 NG/ML (ref 0–0.03)
TROPONIN T SERPL-MCNC: <0.01 NG/ML (ref 0–0.03)
WBC # BLD AUTO: 5.17 10*3/MM3 (ref 3.4–10.8)

## 2020-07-16 PROCEDURE — 36415 COLL VENOUS BLD VENIPUNCTURE: CPT

## 2020-07-16 PROCEDURE — 85610 PROTHROMBIN TIME: CPT | Performed by: EMERGENCY MEDICINE

## 2020-07-16 PROCEDURE — 93005 ELECTROCARDIOGRAM TRACING: CPT | Performed by: EMERGENCY MEDICINE

## 2020-07-16 PROCEDURE — 93010 ELECTROCARDIOGRAM REPORT: CPT | Performed by: INTERNAL MEDICINE

## 2020-07-16 PROCEDURE — 71045 X-RAY EXAM CHEST 1 VIEW: CPT

## 2020-07-16 PROCEDURE — 84484 ASSAY OF TROPONIN QUANT: CPT | Performed by: EMERGENCY MEDICINE

## 2020-07-16 PROCEDURE — 99285 EMERGENCY DEPT VISIT HI MDM: CPT

## 2020-07-16 PROCEDURE — 85025 COMPLETE CBC W/AUTO DIFF WBC: CPT | Performed by: EMERGENCY MEDICINE

## 2020-07-16 PROCEDURE — 80053 COMPREHEN METABOLIC PANEL: CPT | Performed by: EMERGENCY MEDICINE

## 2020-07-16 RX ORDER — SODIUM CHLORIDE 0.9 % (FLUSH) 0.9 %
10 SYRINGE (ML) INJECTION AS NEEDED
Status: DISCONTINUED | OUTPATIENT
Start: 2020-07-16 | End: 2020-07-16 | Stop reason: HOSPADM

## 2020-07-16 NOTE — ED NOTES
Took over care of patient. Rounding completed. Pt currently on phone, resting in bed.      Haydee Shaikh, RN  07/16/20 8654

## 2020-07-16 NOTE — ED NOTES
Pt reports chest pain since 8am while drinking morning coffee. Pain is intermittent. EMS gave Aspirin & 1 NTG. Pt is blind.     Pt wearing a face mask upon arrival.     Ivis Robertson RN  07/16/20 0924

## 2020-07-16 NOTE — DISCHARGE INSTRUCTIONS
Follow-up with Dr. Mccartney and Dr. Sheikh.  Return to the emergency department for recurrent or persistent chest pain, shortness of breath, pain radiating to neck/jaw/arm, nausea, vomiting, sweating, or other concern.

## 2020-07-16 NOTE — ED PROVIDER NOTES
EMERGENCY DEPARTMENT ENCOUNTER    Room Number:  15/15  Date of encounter:  7/16/2020  PCP: Jacob Mccartney MD  Historian: Patient     I used full protective equipment while examining this patient.  This includes face mask, gloves and protective eyewear.  I washed my hands before entering the room and immediately upon leaving the room.  Patient was wearing a surgical mask.      HPI:  Chief Complaint:  chest pain  A complete HPI/ROS/PMH/PSH/SH/FH are unobtainable due to: None    Context: Hans Carter is a 65 y.o. male who presents to the ED c/o intermittent mid anterior chest pain that began around 7:00 this morning.  Pain is described as sharp.  It comes and goes and last for few minutes.  Nothing makes the pain better or worse.  Pain is nonradiating.  Pain is mild.  Patient does not have any chest pain currently.  Patient denies associated nausea, vomiting, sweating, or shortness of breath.  Patient states that he gets chest pain every few days.  He says his symptoms today were not any different than normal.  Patient also reports intermittent shaking but is also chronic.  He denies recent illness, cough, fever, abdominal pain, or headache.  Patient was given aspirin and nitroglycerin by EMS.      PAST MEDICAL HISTORY  Active Ambulatory Problems     Diagnosis Date Noted   • No Active Ambulatory Problems     Resolved Ambulatory Problems     Diagnosis Date Noted   • No Resolved Ambulatory Problems     Past Medical History:   Diagnosis Date   • Blind    • Blind    • Carotid artery stenosis    • CHF (congestive heart failure) (CMS/[facility])    • Coronary artery disease    • Hyperlipidemia    • Hypertension    • Kidney stone    • Marfan syndrome    • TIA (transient ischemic attack)          PAST SURGICAL HISTORY  Past Surgical History:   Procedure Laterality Date   • AORTIC VALVE REPAIR/REPLACEMENT     • CARDIAC CATHETERIZATION     • CARDIAC VALVE REPLACEMENT     • CARDIAC VALVE SURGERY     • CORONARY ARTERY BYPASS  GRAFT     • CYSTOSCOPY INSERTION / REMOVAL STENT / STONE           FAMILY HISTORY  No family history on file.      SOCIAL HISTORY  Social History     Socioeconomic History   • Marital status:      Spouse name: Not on file   • Number of children: Not on file   • Years of education: Not on file   • Highest education level: Not on file   Tobacco Use   • Smoking status: Never Smoker   Substance and Sexual Activity   • Alcohol use: No     Frequency: Never   • Drug use: No   • Sexual activity: Defer         ALLERGIES  Erythromycin       REVIEW OF SYSTEMS  Review of Systems      All systems have been reviewed and are negative except as as discussed in the HPI    PHYSICAL EXAM    I have reviewed the triage vital signs and nursing notes.    ED Triage Vitals [07/16/20 0959]   Temp Heart Rate Resp BP SpO2   99.9 °F (37.7 °C) 87 18 118/76 97 %      Temp src Heart Rate Source Patient Position BP Location FiO2 (%)   -- -- -- -- --       Physical Exam  GENERAL: Awake, alert.  Patient is visually impaired  HENT: NCAT, nares patent, moist mucous membranes  NECK: supple, no lymphadenopathy  EYES: no scleral icterus  CV: regular rhythm, regular rate, no murmur  RESPIRATORY: normal effort, clear to auscultation bilaterally  ABDOMEN: soft, nontender, nondistended  MUSCULOSKELETAL: Extremities are nontender and without obvious deformity.  There is normal range of motion in all extremities.  There is no calf tenderness or pedal edema  NEURO: Strength, sensation, and coordination are grossly intact.  Speech and mentation are unremarkable.  No facial droop.  SKIN: warm, dry, no rash  PSYCH: Normal mood and affect      LAB RESULTS  Recent Results (from the past 24 hour(s))   Comprehensive Metabolic Panel    Collection Time: 07/16/20 10:26 AM   Result Value Ref Range    Glucose 130 (H) 65 - 99 mg/dL    BUN 16 8 - 23 mg/dL    Creatinine 0.89 0.76 - 1.27 mg/dL    Sodium 134 (L) 136 - 145 mmol/L    Potassium 4.5 3.5 - 5.2 mmol/L     Chloride 101 98 - 107 mmol/L    CO2 23.3 22.0 - 29.0 mmol/L    Calcium 8.6 8.6 - 10.5 mg/dL    Total Protein 6.8 6.0 - 8.5 g/dL    Albumin 4.10 3.50 - 5.20 g/dL    ALT (SGPT) 19 1 - 41 U/L    AST (SGOT) 17 1 - 40 U/L    Alkaline Phosphatase 69 39 - 117 U/L    Total Bilirubin 0.4 0.0 - 1.2 mg/dL    eGFR Non African Amer 85 >60 mL/min/1.73    Globulin 2.7 gm/dL    A/G Ratio 1.5 g/dL    BUN/Creatinine Ratio 18.0 7.0 - 25.0    Anion Gap 9.7 5.0 - 15.0 mmol/L   Troponin    Collection Time: 07/16/20 10:26 AM   Result Value Ref Range    Troponin T <0.010 0.000 - 0.030 ng/mL   Protime-INR    Collection Time: 07/16/20 10:26 AM   Result Value Ref Range    Protime 17.4 (H) 11.7 - 14.2 Seconds    INR 1.45 (H) 0.90 - 1.10   CBC Auto Differential    Collection Time: 07/16/20 10:26 AM   Result Value Ref Range    WBC 5.17 3.40 - 10.80 10*3/mm3    RBC 4.50 4.14 - 5.80 10*6/mm3    Hemoglobin 14.6 13.0 - 17.7 g/dL    Hematocrit 43.5 37.5 - 51.0 %    MCV 96.7 79.0 - 97.0 fL    MCH 32.4 26.6 - 33.0 pg    MCHC 33.6 31.5 - 35.7 g/dL    RDW 13.4 12.3 - 15.4 %    RDW-SD 48.5 37.0 - 54.0 fl    MPV 9.4 6.0 - 12.0 fL    Platelets 119 (L) 140 - 450 10*3/mm3    Neutrophil % 86.2 (H) 42.7 - 76.0 %    Lymphocyte % 7.2 (L) 19.6 - 45.3 %    Monocyte % 6.0 5.0 - 12.0 %    Eosinophil % 0.2 (L) 0.3 - 6.2 %    Basophil % 0.2 0.0 - 1.5 %    Immature Grans % 0.2 0.0 - 0.5 %    Neutrophils, Absolute 4.46 1.70 - 7.00 10*3/mm3    Lymphocytes, Absolute 0.37 (L) 0.70 - 3.10 10*3/mm3    Monocytes, Absolute 0.31 0.10 - 0.90 10*3/mm3    Eosinophils, Absolute 0.01 0.00 - 0.40 10*3/mm3    Basophils, Absolute 0.01 0.00 - 0.20 10*3/mm3    Immature Grans, Absolute 0.01 0.00 - 0.05 10*3/mm3    nRBC 0.0 0.0 - 0.2 /100 WBC   Troponin    Collection Time: 07/16/20 12:30 PM   Result Value Ref Range    Troponin T <0.010 0.000 - 0.030 ng/mL       Ordered the above labs and independently reviewed the results.      RADIOLOGY  Xr Chest 1 View    Result Date: 7/16/2020  AP  PORTABLE UPRIGHT CHEST  HISTORY: Chest pain that began this a.m.  COMPARISON: PA and lateral chest 04/20/2018.  FINDINGS: Sternotomy wires, prosthetic valve are present. The thoracic aorta is tortuous. The lungs appear clear and there is no evidence for pulmonary edema or pleural effusion or infiltrate.      No evidence for active disease in the chest.        I ordered the above noted radiological studies. Reviewed by me and discussed with radiologist.  See dictation for official radiology interpretation.      PROCEDURES  Procedures      MEDICATIONS GIVEN IN ER    Medications   sodium chloride 0.9 % flush 10 mL (has no administration in time range)         PROGRESS, DATA ANALYSIS, CONSULTS, AND MEDICAL DECISION MAKING    All labs have been independently reviewed by me.  All radiology studies have been reviewed by me and discussed with radiologist dictating the report.   EKG's independently viewed and interpreted by me.  I have reviewed the nurse's notes, vital signs, past medical history, and medication list.  Discussion below represents my analysis of pertinent findings related to patient's condition, differential diagnosis, treatment plan and final disposition.      ED Course as of Jul 16 1448   Thu Jul 16, 2020   1014 Old records reviewed.  Patient had a cardiac cath done in 2014.  This showed two-vessel disease with 1 of 3 bypass grafts patent.  Stent was placed in the ramus intermedius.  He had a stress test done in November 2016 which showed a possible area of infarction but there was no clear evidence of pharmacologically induced myocardial ischemia.  There was normal LV function.    [WH]   1023 EKG          EKG time: 10:14 AM  Rhythm/Rate: Sinus rhythm rate 91  P waves and CA: Normal, first-degree AV block  QRS, axis: RBBB, RAD  ST and T waves: Mild ST elevation anteriorly, no ST depression    Interpreted Contemporaneously by me, independently viewed  EKG is changed compared to prior EKG done 3/3/2015.   Right bundle branch and first-degree AV block were not present at that time      [WH]   1314 Patient is resting comfortably.  He has not had any further chest pain.  Repeat troponin was negative.  Heart score is 5 (2 points each for age and risk factors, 1 for EKG).  Call will be placed to Dr. Mccartney, the patient's PCP.    [WH]   1328 Case discussed with Dr. Mccartney.  Pertinent exam findings, test results, and ED course were discussed with him.  He agrees with the plan to discharge the patient and have him follow-up with his cardiologist.    [WH]   1348 Patient's chest pain was atypical.  He did not have any chest pain while in the ER.  Serial troponins were negative.  Chest x-ray was normal.  Case was discussed with Dr. Mccartney.  Patient was advised to follow-up with his cardiologist.    [WH]      ED Course User Index  [WH] Guille Joyce MD       AS OF 14:48 VITALS:    BP - 122/70  HR - 81  TEMP - 99.9 °F (37.7 °C)  O2 SATS - 97%      DIAGNOSIS  Final diagnoses:   Atypical chest pain         DISPOSITION  Discharge    DISCHARGE    Patient discharged in stable condition.    Reviewed implications of results, diagnosis, meds, responsibility to follow up, warning signs and symptoms of possible worsening, potential complications and reasons to return to ER, including worsening or persistent chest pain, shortness of breath, nausea, vomiting, sweating, or other concern.    Patient/Family voiced understanding of above instructions.    Discussed plan for discharge, as there is no emergent indication for admission. Patient referred to primary care provider for BP management due to today's BP. Pt/family is agreeable and understands need for follow up and repeat testing.  Pt is aware that discharge does not mean that nothing is wrong but it indicates no emergency is present that requires admission and they must continue care with follow-up as given below or physician of their choice.     FOLLOW-UP  Jacob Mccartney,  MD  [address] [facility] [address]  [place] [id number]  [phone]    Schedule an appointment as soon as possible for a visit       Guille Sheikh MD  [address] [facility] [address] [place] [address]  [place] [id number] [phone]    Schedule an appointment as soon as possible for a visit           Medication List      No changes were made to your prescriptions during this visit.           Please note that this document was completed using voice recognition software     Guille Joyce MD  07/16/20 5434